# Patient Record
Sex: MALE | Race: BLACK OR AFRICAN AMERICAN | Employment: STUDENT | URBAN - METROPOLITAN AREA
[De-identification: names, ages, dates, MRNs, and addresses within clinical notes are randomized per-mention and may not be internally consistent; named-entity substitution may affect disease eponyms.]

---
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
## 2024-06-09 ENCOUNTER — APPOINTMENT (OUTPATIENT)
Dept: GENERAL RADIOLOGY | Age: 19
End: 2024-06-09

## 2024-06-09 ENCOUNTER — HOSPITAL ENCOUNTER (EMERGENCY)
Age: 19
Discharge: HOME | End: 2024-06-09

## 2024-06-09 VITALS
OXYGEN SATURATION: 100 % | DIASTOLIC BLOOD PRESSURE: 67 MMHG | SYSTOLIC BLOOD PRESSURE: 127 MMHG | TEMPERATURE: 98.1 F | BODY MASS INDEX: 21.48 KG/M2 | HEART RATE: 57 BPM | HEIGHT: 69 IN | RESPIRATION RATE: 17 BRPM | WEIGHT: 145 LBS

## 2024-06-09 DIAGNOSIS — S62.300A CLOSED NONDISPLACED FRACTURE OF SECOND METACARPAL BONE OF RIGHT HAND, UNSPECIFIED PORTION OF METACARPAL, INITIAL ENCOUNTER: Primary | ICD-10-CM

## 2024-06-09 PROCEDURE — 29125 APPL SHORT ARM SPLINT STATIC: CPT

## 2024-06-09 PROCEDURE — 99283 EMERGENCY DEPT VISIT LOW MDM: CPT

## 2024-06-09 PROCEDURE — 73130 X-RAY EXAM OF HAND: CPT

## 2024-06-09 PROCEDURE — 6370000000 HC RX 637 (ALT 250 FOR IP): Performed by: PHYSICIAN ASSISTANT

## 2024-06-09 RX ORDER — IBUPROFEN 600 MG/1
600 TABLET ORAL ONCE
Status: COMPLETED | OUTPATIENT
Start: 2024-06-09 | End: 2024-06-09

## 2024-06-09 RX ORDER — ONDANSETRON 4 MG/1
4 TABLET, FILM COATED ORAL EVERY 8 HOURS PRN
Qty: 20 TABLET | Refills: 0 | Status: SHIPPED | OUTPATIENT
Start: 2024-06-09

## 2024-06-09 RX ORDER — HYDROCODONE BITARTRATE AND ACETAMINOPHEN 5; 325 MG/1; MG/1
1 TABLET ORAL ONCE
Status: DISCONTINUED | OUTPATIENT
Start: 2024-06-09 | End: 2024-06-09 | Stop reason: HOSPADM

## 2024-06-09 RX ORDER — HYDROCODONE BITARTRATE AND ACETAMINOPHEN 5; 325 MG/1; MG/1
1 TABLET ORAL EVERY 4 HOURS PRN
Qty: 18 TABLET | Refills: 0 | Status: SHIPPED | OUTPATIENT
Start: 2024-06-09 | End: 2024-06-12

## 2024-06-09 RX ADMIN — IBUPROFEN 600 MG: 600 TABLET, FILM COATED ORAL at 20:58

## 2024-06-10 ENCOUNTER — TELEPHONE (OUTPATIENT)
Dept: ORTHOPEDIC SURGERY | Age: 19
End: 2024-06-10
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
## 2024-06-10 NOTE — TELEPHONE ENCOUNTER
General Question     Subject: RT HAND   Patient and /or Facility Request:   Contact Number: [phone]     PATIENT CALLING TO [facility] APPT FOR closed fracture of the rt hand.     SEND OVER TO TRIAGE TEAM

## 2024-06-10 NOTE — ED NOTES
Pt states he took 1500 mg of tylenol 1 hour prior to arrival. Held Norco at this time, given 600 mg of Ibuprofen PO.

## 2024-06-12 ENCOUNTER — OFFICE VISIT (OUTPATIENT)
Dept: ORTHOPEDIC SURGERY | Age: 19
End: 2024-06-12

## 2024-06-12 VITALS — HEIGHT: 69 IN | BODY MASS INDEX: 21.48 KG/M2 | WEIGHT: 145 LBS

## 2024-06-12 DIAGNOSIS — S62.390A CLOSED NONDISPLACED FRACTURE OF OTHER PART OF SECOND METACARPAL BONE OF RIGHT HAND, INITIAL ENCOUNTER: Primary | ICD-10-CM

## 2024-06-12 PROCEDURE — 99202 OFFICE O/P NEW SF 15 MIN: CPT | Performed by: ORTHOPAEDIC SURGERY

## 2024-06-12 PROCEDURE — 26600 TREAT METACARPAL FRACTURE: CPT | Performed by: ORTHOPAEDIC SURGERY
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
## 2024-11-08 ENCOUNTER — APPOINTMENT (OUTPATIENT)
Dept: GENERAL RADIOLOGY | Age: 19
End: 2024-11-08

## 2024-11-08 ENCOUNTER — HOSPITAL ENCOUNTER (EMERGENCY)
Age: 19
Discharge: LEFT BEFORE DISCHARGE | End: 2024-11-08
Attending: EMERGENCY MEDICINE

## 2024-11-08 VITALS
BODY MASS INDEX: 22.79 KG/M2 | SYSTOLIC BLOOD PRESSURE: 128 MMHG | RESPIRATION RATE: 16 BRPM | HEART RATE: 60 BPM | HEIGHT: 68 IN | OXYGEN SATURATION: 100 % | WEIGHT: 150.4 LBS | TEMPERATURE: 97.7 F | DIASTOLIC BLOOD PRESSURE: 63 MMHG

## 2024-11-08 DIAGNOSIS — M25.512 CHRONIC LEFT SHOULDER PAIN: Primary | ICD-10-CM

## 2024-11-08 DIAGNOSIS — G89.29 CHRONIC LEFT SHOULDER PAIN: Primary | ICD-10-CM

## 2024-11-08 PROCEDURE — 73030 X-RAY EXAM OF SHOULDER: CPT

## 2024-11-08 PROCEDURE — 6370000000 HC RX 637 (ALT 250 FOR IP): Performed by: EMERGENCY MEDICINE

## 2024-11-08 PROCEDURE — 99283 EMERGENCY DEPT VISIT LOW MDM: CPT

## 2024-11-08 RX ORDER — IBUPROFEN 600 MG/1
600 TABLET, FILM COATED ORAL 3 TIMES DAILY PRN
Qty: 30 TABLET | Refills: 0 | Status: SHIPPED | OUTPATIENT
Start: 2024-11-08

## 2024-11-08 RX ORDER — IBUPROFEN 600 MG/1
600 TABLET, FILM COATED ORAL ONCE
Status: COMPLETED | OUTPATIENT
Start: 2024-11-08 | End: 2024-11-08

## 2024-11-08 RX ADMIN — IBUPROFEN 600 MG: 600 TABLET, FILM COATED ORAL at 07:22

## 2024-11-08 ASSESSMENT — PAIN DESCRIPTION - PAIN TYPE: TYPE: ACUTE PAIN

## 2024-11-08 ASSESSMENT — PAIN - FUNCTIONAL ASSESSMENT
PAIN_FUNCTIONAL_ASSESSMENT: ACTIVITIES ARE NOT PREVENTED
PAIN_FUNCTIONAL_ASSESSMENT: NONE - DENIES PAIN
PAIN_FUNCTIONAL_ASSESSMENT: 0-10
PAIN_FUNCTIONAL_ASSESSMENT: ACTIVITIES ARE NOT PREVENTED

## 2024-11-08 ASSESSMENT — PAIN DESCRIPTION - ORIENTATION: ORIENTATION: LEFT

## 2024-11-08 ASSESSMENT — PAIN SCALES - GENERAL
PAINLEVEL_OUTOF10: 7
PAINLEVEL_OUTOF10: 7

## 2024-11-08 ASSESSMENT — PAIN DESCRIPTION - LOCATION: LOCATION: SHOULDER

## 2024-11-08 ASSESSMENT — ENCOUNTER SYMPTOMS: BACK PAIN: 0

## 2024-11-08 ASSESSMENT — PAIN DESCRIPTION - DESCRIPTORS: DESCRIPTORS: DISCOMFORT

## 2024-11-08 NOTE — ED PROVIDER NOTES
[facility] EMERGENCY DEPARTMENT  EMERGENCY DEPARTMENT ENCOUNTER        Pt Name: Marquise Deng  MRN: [id number]  Birthdate 2005  Date of evaluation: 11/8/2024  Provider: Maximus Frank MD  PCP: No primary care provider on file.  Note Started: 7:12 AM EST 11/8/24    CHIEF COMPLAINT       Chief Complaint   Patient presents with    Shoulder Pain     Left shoulder pain started in July while playing football. No new injury.        HISTORY OF PRESENT ILLNESS: 1 or more Elements     History from : Patient    Limitations to history : None    Marquise Deng is a 19 y.o. male who presents for left shoulder pain that has been gradual onset constant worsening since July.  Patient states that he was playing football and dove forward and landed and immediately had pain on his left shoulder.  Patient states that he has injured his left shoulder previously has dislocated it.  Denies any numbness or weakness.  Has not taken anything for the pain.  Worse with lifting his shoulder.  No difficulty with range of motion.  Has not seen an orthopedic doctor for this injury at this time.  No other modifying factors.    Nursing Notes were all reviewed and agreed with or any disagreements were addressed in the HPI.    REVIEW OF SYSTEMS :      Review of Systems   Musculoskeletal:  Positive for arthralgias. Negative for back pain, myalgias and neck pain.   Neurological:  Negative for weakness and numbness.       Positives and Pertinent negatives as per HPI.     SURGICAL HISTORY   No past surgical history on file.    CURRENTMEDICATIONS       Discharge Medication List as of 11/8/2024  9:21 AM        CONTINUE these medications which have NOT CHANGED    Details   ondansetron (ZOFRAN) 4 MG tablet Take 1 tablet by mouth every 8 hours as needed for Nausea, Disp-20 tablet, R-0Print             ALLERGIES     Patient has no known allergies.    FAMILYHISTORY     No family history on file.     SOCIAL HISTORY       Social  found.    PROCEDURES   Unless otherwise noted below, none     Procedures    CRITICAL CARE TIME   Total Critical Care time was 0 minutes, excluding separately reportable procedures.  There was a high probability of clinically significant/life threatening deterioration in the patient's condition which required my urgent intervention.       This includes multiple reevaluations, vital sign monitoring, pulse oximetry monitoring, telemetry monitoring, clinical response to the IV medications, reviewing the nursing notes, consultation time, dictation/documentation time, discussions with the patients/family, and interpretation of the labwork. (This time excludes time spent performing procedures). See ED course for details of reevaluation and medical decision making.     PAST MEDICAL HISTORY      has no past medical history on file.     EMERGENCY DEPARTMENT COURSE and DIFFERENTIAL DIAGNOSIS/MDM:   Vitals:    Vitals:    11/08/24 0714   BP: 128/63   Pulse: 60   Resp: 16   Temp: 97.7 °F (36.5 °C)   TempSrc: Oral   SpO2: 100%   Weight: 68.2 kg (150 lb 6.4 oz)   Height: 1.727 m (5' 8\")         Is this patient to be included in the SEP-1 Core Measure due to severe sepsis or septic shock?   No   Exclusion criteria - the patient is NOT to be included for SEP-1 Core Measure due to:  Infection is not suspected      Chronic Conditions: None    CONSULTS: (Who and What was discussed)  None    Discussion with Other Profesionals : None    Social Determinants : None    Records Reviewed : Outpatient Notes reviewed outpatient office visit note from June 12, 2020 form patient was seen for hand fracture    CC/HPI Summary, DDx, ED Course, and Reassessment:     Differential diagnosis: includes but not limited to rotator cuff sprain/tear/rupture, clavicle fracture, humerus fracture, scapular fracture, posterior or anterior glenohumeral joint dislocation, glenohumeral subluxation, AC joint separation, brachioplexus injury or other peripheral nerve

## 2025-05-06 ENCOUNTER — APPOINTMENT (OUTPATIENT)
Dept: GENERAL RADIOLOGY | Age: 20
End: 2025-05-06
Payer: MEDICAID

## 2025-05-06 ENCOUNTER — HOSPITAL ENCOUNTER (EMERGENCY)
Age: 20
Discharge: HOME | End: 2025-05-06
Attending: EMERGENCY MEDICINE
Payer: MEDICAID

## 2025-05-06 VITALS
DIASTOLIC BLOOD PRESSURE: 86 MMHG | RESPIRATION RATE: 16 BRPM | OXYGEN SATURATION: 100 % | HEART RATE: 57 BPM | TEMPERATURE: 98.6 F | BODY MASS INDEX: 22.09 KG/M2 | SYSTOLIC BLOOD PRESSURE: 142 MMHG | HEIGHT: 68 IN | WEIGHT: 145.72 LBS

## 2025-05-06 DIAGNOSIS — S51.812A FOREARM LACERATION, LEFT, INITIAL ENCOUNTER: Primary | ICD-10-CM

## 2025-05-06 LAB — TRICHOMONAS #/AREA URNS HPF: NORMAL /[HPF]

## 2025-05-06 PROCEDURE — 12001 RPR S/N/AX/GEN/TRNK 2.5CM/<: CPT

## 2025-05-06 PROCEDURE — 87591 N.GONORRHOEAE DNA AMP PROB: CPT

## 2025-05-06 PROCEDURE — 87491 CHLMYD TRACH DNA AMP PROBE: CPT

## 2025-05-06 PROCEDURE — 6370000000 HC RX 637 (ALT 250 FOR IP): Performed by: EMERGENCY MEDICINE

## 2025-05-06 PROCEDURE — 81015 MICROSCOPIC EXAM OF URINE: CPT

## 2025-05-06 PROCEDURE — 99284 EMERGENCY DEPT VISIT MOD MDM: CPT

## 2025-05-06 PROCEDURE — 73090 X-RAY EXAM OF FOREARM: CPT

## 2025-05-06 RX ORDER — CEPHALEXIN 500 MG/1
500 CAPSULE ORAL ONCE
Status: COMPLETED | OUTPATIENT
Start: 2025-05-06 | End: 2025-05-06

## 2025-05-06 RX ORDER — CEPHALEXIN 500 MG/1
500 CAPSULE ORAL 4 TIMES DAILY
Qty: 20 CAPSULE | Refills: 0 | Status: SHIPPED | OUTPATIENT
Start: 2025-05-06 | End: 2025-05-11

## 2025-05-06 RX ADMIN — CEPHALEXIN 500 MG: 500 CAPSULE ORAL at 08:56

## 2025-05-06 ASSESSMENT — PAIN - FUNCTIONAL ASSESSMENT
PAIN_FUNCTIONAL_ASSESSMENT: 0-10
PAIN_FUNCTIONAL_ASSESSMENT: 0-10
PAIN_FUNCTIONAL_ASSESSMENT: ACTIVITIES ARE NOT PREVENTED
PAIN_FUNCTIONAL_ASSESSMENT: ACTIVITIES ARE NOT PREVENTED

## 2025-05-06 ASSESSMENT — PAIN DESCRIPTION - ONSET
ONSET: ON-GOING
ONSET: ON-GOING

## 2025-05-06 ASSESSMENT — PAIN DESCRIPTION - ORIENTATION
ORIENTATION: LEFT
ORIENTATION: LEFT

## 2025-05-06 ASSESSMENT — PAIN DESCRIPTION - DESCRIPTORS
DESCRIPTORS: SHARP;THROBBING
DESCRIPTORS: DISCOMFORT

## 2025-05-06 ASSESSMENT — PAIN SCALES - GENERAL
PAINLEVEL_OUTOF10: 7
PAINLEVEL_OUTOF10: 2

## 2025-05-06 ASSESSMENT — PAIN DESCRIPTION - PAIN TYPE
TYPE: ACUTE PAIN
TYPE: ACUTE PAIN

## 2025-05-06 ASSESSMENT — PAIN DESCRIPTION - LOCATION
LOCATION: ARM
LOCATION: ARM

## 2025-05-06 NOTE — DISCHARGE INSTRUCTIONS
Follow-up with your primary care physician for suture or staple removal in 7 to 10 days.  Keep wound clean and dry for at least 24 hours.  After 24 hours you may shower and clean the area with warm soapy water.  Avoid submerging the wound underwater in a bathtub, swimming pool, or hot tub until sutures are removed.  If condition worsens or new symptoms develop, return immediately to the emergency department.

## 2025-05-06 NOTE — ED NOTES
Arm cleansed, pso applied with bandage and ACE wrap for protection. Wound care instructions given. Pt verbalized understanding.

## 2025-05-06 NOTE — ED NOTES
Pt calls nurse over requesting \"to be tested.\"  He states he just found out his girlfriend was cheating on him.

## 2025-05-06 NOTE — ED PROVIDER NOTES
[facility] EMERGENCY DEPARTMENT  EMERGENCY DEPARTMENT ENCOUNTER      Pt Name: Marquise Deng  MRN: [id number]  Birthdate 2005  Date of evaluation: 5/6/2025  Provider: SARAH WATKINS DO    CHIEF COMPLAINT       Chief Complaint   Patient presents with    Laceration     Pt has an approx 2 cm laceration to left posterior forearm he has an additional laceration just above that is 0.3mm. He states it happened last night while playing football another person's cleat injured his arm. Area cleansed bleeding controlled.         HISTORY OF PRESENT ILLNESS   (Location/Symptom, Timing/Onset, Context/Setting, Quality, Duration, Modifying Factors, Severity)  Note limiting factors.   Marquise Deng is a 20 y.o. male who presents to the emergency department with a complaint of a laceration to the left dorsal forearm that occurred yesterday in football practice at 7 PM.  He states \"I got cleated\".  He reports that another player stepped on his forearm.  He denies any other injury.  Denies any foreign body.  No weakness or numbness.    He does not take any anticoagulants.  He is not diabetic.    Nursing Notes were reviewed.    HPI        REVIEW OF SYSTEMS    (2-9 systems for level 4, 10 or more for level 5)       Constitutional: Negative for fever or chills.     HENT: Negative for rhinorrhea and sore throat.    Eyes: Negative for redness or drainage.     Respiratory: Negative for shortness of breath or dyspnea on exertion.     Cardiovascular: Negative for chest pain.   Gastrointestinal: Negative for abdominal pain.  Negative for vomiting or diarrhea.   Neurological: Negative for headache.    Genitourinary: Negative for flank pain.  Negative for dysuria.  Negative for hematuria.           All systems are reviewed and are negative except for those listed above in the history of present illness and ROS.        PAST MEDICAL HISTORY   History reviewed. No pertinent past medical history.      SURGICAL HISTORY

## 2025-05-07 ENCOUNTER — RESULTS FOLLOW-UP (OUTPATIENT)
Dept: EMERGENCY DEPT | Age: 20
End: 2025-05-07

## 2025-05-07 LAB
C TRACH DNA UR QL NAA+PROBE: POSITIVE
N GONORRHOEA DNA UR QL NAA+PROBE: NEGATIVE

## 2025-05-07 NOTE — RESULT ENCOUNTER NOTE
Culture reviewed, please contact patient and inform them of the results and call in a prescription for doxycycline 100 mg by mouth 2 times daily for 10 days.

## 2025-05-08 NOTE — RESULT ENCOUNTER NOTE
Patient's positive result has been appropriately evaluated by the provider pool.   Patient was contacted and notified of the change in treatment plan.    Medication was phoned to the patient's pharmacy per protocol:    Pharmacy:   [id number] [place], [place] - [address] [place] [phone] - F [phone]  [address]  [facility] [address]  Phone: [phone] Fax: [phone]     CALLED AND SPOKE WITH PT AND CALLED IN MEDICATION TO THE PHARMACY LISTED ABOVE